# Patient Record
Sex: MALE | Race: WHITE | NOT HISPANIC OR LATINO | Employment: FULL TIME | URBAN - METROPOLITAN AREA
[De-identification: names, ages, dates, MRNs, and addresses within clinical notes are randomized per-mention and may not be internally consistent; named-entity substitution may affect disease eponyms.]

---

## 2018-06-21 ENCOUNTER — APPOINTMENT (OUTPATIENT)
Dept: GENERAL RADIOLOGY | Facility: HOSPITAL | Age: 23
End: 2018-06-21

## 2018-06-21 PROCEDURE — 73660 X-RAY EXAM OF TOE(S): CPT | Performed by: EMERGENCY MEDICINE

## 2018-06-22 ENCOUNTER — HOSPITAL ENCOUNTER (EMERGENCY)
Facility: HOSPITAL | Age: 23
Discharge: HOME | End: 2018-06-22
Attending: EMERGENCY MEDICINE | Admitting: EMERGENCY MEDICINE

## 2018-06-22 ENCOUNTER — TELEPHONE (OUTPATIENT)
Dept: ORTHOPEDIC SURGERY | Facility: CLINIC | Age: 23
End: 2018-06-22

## 2018-06-22 VITALS
DIASTOLIC BLOOD PRESSURE: 83 MMHG | SYSTOLIC BLOOD PRESSURE: 142 MMHG | TEMPERATURE: 98 F | BODY MASS INDEX: 29.82 KG/M2 | OXYGEN SATURATION: 100 % | HEIGHT: 73 IN | WEIGHT: 225 LBS | RESPIRATION RATE: 18 BRPM | HEART RATE: 96 BPM

## 2018-06-22 DIAGNOSIS — S92.424B OPEN NONDISPLACED FRACTURE OF DISTAL PHALANX OF RIGHT GREAT TOE, INITIAL ENCOUNTER: Primary | ICD-10-CM

## 2018-06-22 PROCEDURE — 99283 EMERGENCY DEPT VISIT LOW MDM: CPT

## 2018-06-22 NOTE — TELEPHONE ENCOUNTER
Pat had a 300lb container fall on his Rt foot on 6/21/18. Pat went to  on same date (XR in epic). Pat has a fx of his rt great toe and a laceration under his nail. Pat asking if he can be seen today, Please advise. Thank you!

## 2018-06-22 NOTE — TELEPHONE ENCOUNTER
I called and spoke with the patient before he got to the office in order to discuss his injury more.  He did have a 300 pound canister fall on his toe and was seen at urgent care yesterday with bleeding from beneath his nail and was found to have a fracture of the distal phalanx.  He stated he was still having some bleeding from under the nail today.    I reviewed with him that I am not equipped to handle this in the office and am leaving to go out of town early this afternoon.  Therefore recommended that he go to the emergency room ( rather than urgent care) for further evaluation and treatment by the emergency physicians or on-call orthopedic doctor if necessary.  He appreciated the call and understood these instructions